# Patient Record
Sex: MALE | Race: WHITE | NOT HISPANIC OR LATINO | URBAN - METROPOLITAN AREA
[De-identification: names, ages, dates, MRNs, and addresses within clinical notes are randomized per-mention and may not be internally consistent; named-entity substitution may affect disease eponyms.]

---

## 2017-06-04 ENCOUNTER — INPATIENT (INPATIENT)
Facility: HOSPITAL | Age: 57
LOS: 0 days | Discharge: HOME | DRG: 340 | End: 2017-06-05
Attending: STUDENT IN AN ORGANIZED HEALTH CARE EDUCATION/TRAINING PROGRAM | Admitting: STUDENT IN AN ORGANIZED HEALTH CARE EDUCATION/TRAINING PROGRAM
Payer: COMMERCIAL

## 2017-06-04 VITALS
WEIGHT: 194.01 LBS | HEART RATE: 69 BPM | TEMPERATURE: 98 F | OXYGEN SATURATION: 99 % | SYSTOLIC BLOOD PRESSURE: 171 MMHG | HEIGHT: 72 IN | RESPIRATION RATE: 18 BRPM | DIASTOLIC BLOOD PRESSURE: 81 MMHG

## 2017-06-04 DIAGNOSIS — K35.3 ACUTE APPENDICITIS WITH LOCALIZED PERITONITIS: ICD-10-CM

## 2017-06-04 DIAGNOSIS — M21.969 UNSPECIFIED ACQUIRED DEFORMITY OF UNSPECIFIED LOWER LEG: Chronic | ICD-10-CM

## 2017-06-04 LAB
ACETONE SERPL-MCNC: NEGATIVE — SIGNIFICANT CHANGE UP
ALBUMIN SERPL ELPH-MCNC: 4.4 G/DL — SIGNIFICANT CHANGE UP (ref 3.3–5.2)
ALP SERPL-CCNC: 63 U/L — SIGNIFICANT CHANGE UP (ref 40–120)
ALT FLD-CCNC: 15 U/L — SIGNIFICANT CHANGE UP
ANION GAP SERPL CALC-SCNC: 13 MMOL/L — SIGNIFICANT CHANGE UP (ref 5–17)
APPEARANCE UR: CLEAR — SIGNIFICANT CHANGE UP
AST SERPL-CCNC: 15 U/L — SIGNIFICANT CHANGE UP
B-OH-BUTYR SERPL-SCNC: 0.3 MMOL/L — SIGNIFICANT CHANGE UP
BASOPHILS # BLD AUTO: 0 K/UL — SIGNIFICANT CHANGE UP (ref 0–0.2)
BASOPHILS NFR BLD AUTO: 0.1 % — SIGNIFICANT CHANGE UP (ref 0–2)
BILIRUB SERPL-MCNC: 0.5 MG/DL — SIGNIFICANT CHANGE UP (ref 0.4–2)
BILIRUB UR-MCNC: NEGATIVE — SIGNIFICANT CHANGE UP
BUN SERPL-MCNC: 23 MG/DL — HIGH (ref 8–20)
CALCIUM SERPL-MCNC: 9.4 MG/DL — SIGNIFICANT CHANGE UP (ref 8.6–10.2)
CHLORIDE SERPL-SCNC: 100 MMOL/L — SIGNIFICANT CHANGE UP (ref 98–107)
CO2 SERPL-SCNC: 24 MMOL/L — SIGNIFICANT CHANGE UP (ref 22–29)
COLOR SPEC: SIGNIFICANT CHANGE UP
CREAT SERPL-MCNC: 1.01 MG/DL — SIGNIFICANT CHANGE UP (ref 0.5–1.3)
DIFF PNL FLD: NEGATIVE — SIGNIFICANT CHANGE UP
EOSINOPHIL # BLD AUTO: 0 K/UL — SIGNIFICANT CHANGE UP (ref 0–0.5)
EOSINOPHIL NFR BLD AUTO: 0.4 % — SIGNIFICANT CHANGE UP (ref 0–6)
GLUCOSE SERPL-MCNC: 161 MG/DL — HIGH (ref 70–115)
GLUCOSE UR QL: NEGATIVE MG/DL — SIGNIFICANT CHANGE UP
HCT VFR BLD CALC: 39.6 % — LOW (ref 42–52)
HGB BLD-MCNC: 14 G/DL — SIGNIFICANT CHANGE UP (ref 14–18)
KETONES UR-MCNC: ABNORMAL
LEUKOCYTE ESTERASE UR-ACNC: NEGATIVE — SIGNIFICANT CHANGE UP
LYMPHOCYTES # BLD AUTO: 1.2 K/UL — SIGNIFICANT CHANGE UP (ref 1–4.8)
LYMPHOCYTES # BLD AUTO: 10.6 % — LOW (ref 20–55)
MCHC RBC-ENTMCNC: 28.5 PG — SIGNIFICANT CHANGE UP (ref 27–31)
MCHC RBC-ENTMCNC: 35.4 G/DL — SIGNIFICANT CHANGE UP (ref 32–36)
MCV RBC AUTO: 80.7 FL — SIGNIFICANT CHANGE UP (ref 80–94)
MONOCYTES # BLD AUTO: 0.6 K/UL — SIGNIFICANT CHANGE UP (ref 0–0.8)
MONOCYTES NFR BLD AUTO: 5.3 % — SIGNIFICANT CHANGE UP (ref 3–10)
NEUTROPHILS # BLD AUTO: 9.4 K/UL — HIGH (ref 1.8–8)
NEUTROPHILS NFR BLD AUTO: 83.5 % — HIGH (ref 37–73)
NITRITE UR-MCNC: NEGATIVE — SIGNIFICANT CHANGE UP
PH UR: 5 — SIGNIFICANT CHANGE UP (ref 5–8)
PLATELET # BLD AUTO: 193 K/UL — SIGNIFICANT CHANGE UP (ref 150–400)
POTASSIUM SERPL-MCNC: 4.4 MMOL/L — SIGNIFICANT CHANGE UP (ref 3.5–5.3)
POTASSIUM SERPL-SCNC: 4.4 MMOL/L — SIGNIFICANT CHANGE UP (ref 3.5–5.3)
PROT SERPL-MCNC: 7.6 G/DL — SIGNIFICANT CHANGE UP (ref 6.6–8.7)
PROT UR-MCNC: NEGATIVE MG/DL — SIGNIFICANT CHANGE UP
RBC # BLD: 4.91 M/UL — SIGNIFICANT CHANGE UP (ref 4.6–6.2)
RBC # FLD: 12.8 % — SIGNIFICANT CHANGE UP (ref 11–15.6)
SODIUM SERPL-SCNC: 137 MMOL/L — SIGNIFICANT CHANGE UP (ref 135–145)
SP GR SPEC: 1.01 — SIGNIFICANT CHANGE UP (ref 1.01–1.02)
UROBILINOGEN FLD QL: NEGATIVE MG/DL — SIGNIFICANT CHANGE UP
WBC # BLD: 11.3 K/UL — HIGH (ref 4.8–10.8)
WBC # FLD AUTO: 11.3 K/UL — HIGH (ref 4.8–10.8)

## 2017-06-04 PROCEDURE — 99285 EMERGENCY DEPT VISIT HI MDM: CPT | Mod: 25

## 2017-06-04 PROCEDURE — 93010 ELECTROCARDIOGRAM REPORT: CPT

## 2017-06-04 PROCEDURE — 74177 CT ABD & PELVIS W/CONTRAST: CPT | Mod: 26

## 2017-06-04 PROCEDURE — 88304 TISSUE EXAM BY PATHOLOGIST: CPT | Mod: 26

## 2017-06-04 RX ORDER — KETOROLAC TROMETHAMINE 30 MG/ML
30 SYRINGE (ML) INJECTION ONCE
Qty: 0 | Refills: 0 | Status: DISCONTINUED | OUTPATIENT
Start: 2017-06-04 | End: 2017-06-04

## 2017-06-04 RX ORDER — MORPHINE SULFATE 50 MG/1
2 CAPSULE, EXTENDED RELEASE ORAL ONCE
Qty: 0 | Refills: 0 | Status: DISCONTINUED | OUTPATIENT
Start: 2017-06-04 | End: 2017-06-04

## 2017-06-04 RX ORDER — KETOROLAC TROMETHAMINE 30 MG/ML
30 SYRINGE (ML) INJECTION ONCE
Qty: 0 | Refills: 0 | Status: COMPLETED | OUTPATIENT
Start: 2017-06-04 | End: 2017-06-04

## 2017-06-04 RX ORDER — CEFOTETAN DISODIUM 1 G
2 VIAL (EA) INJECTION ONCE
Qty: 0 | Refills: 0 | Status: DISCONTINUED | OUTPATIENT
Start: 2017-06-04 | End: 2017-06-05

## 2017-06-04 RX ORDER — PIPERACILLIN AND TAZOBACTAM 4; .5 G/20ML; G/20ML
3.38 INJECTION, POWDER, LYOPHILIZED, FOR SOLUTION INTRAVENOUS ONCE
Qty: 0 | Refills: 0 | Status: COMPLETED | OUTPATIENT
Start: 2017-06-04 | End: 2017-06-04

## 2017-06-04 RX ORDER — SODIUM CHLORIDE 9 MG/ML
1000 INJECTION, SOLUTION INTRAVENOUS
Qty: 0 | Refills: 0 | Status: DISCONTINUED | OUTPATIENT
Start: 2017-06-04 | End: 2017-06-05

## 2017-06-04 RX ORDER — ONDANSETRON 8 MG/1
4 TABLET, FILM COATED ORAL ONCE
Qty: 0 | Refills: 0 | Status: COMPLETED | OUTPATIENT
Start: 2017-06-04 | End: 2017-06-04

## 2017-06-04 RX ORDER — SODIUM CHLORIDE 9 MG/ML
1000 INJECTION, SOLUTION INTRAVENOUS ONCE
Qty: 0 | Refills: 0 | Status: COMPLETED | OUTPATIENT
Start: 2017-06-04 | End: 2017-06-04

## 2017-06-04 RX ADMIN — ONDANSETRON 4 MILLIGRAM(S): 8 TABLET, FILM COATED ORAL at 08:04

## 2017-06-04 RX ADMIN — SODIUM CHLORIDE 3000 MILLILITER(S): 9 INJECTION, SOLUTION INTRAVENOUS at 08:04

## 2017-06-04 RX ADMIN — Medication 30 MILLIGRAM(S): at 08:05

## 2017-06-04 RX ADMIN — ONDANSETRON 4 MILLIGRAM(S): 8 TABLET, FILM COATED ORAL at 12:53

## 2017-06-04 RX ADMIN — SODIUM CHLORIDE 150 MILLILITER(S): 9 INJECTION, SOLUTION INTRAVENOUS at 10:40

## 2017-06-04 RX ADMIN — MORPHINE SULFATE 2 MILLIGRAM(S): 50 CAPSULE, EXTENDED RELEASE ORAL at 13:00

## 2017-06-04 RX ADMIN — MORPHINE SULFATE 2 MILLIGRAM(S): 50 CAPSULE, EXTENDED RELEASE ORAL at 12:55

## 2017-06-04 RX ADMIN — PIPERACILLIN AND TAZOBACTAM 200 GRAM(S): 4; .5 INJECTION, POWDER, LYOPHILIZED, FOR SOLUTION INTRAVENOUS at 10:40

## 2017-06-04 RX ADMIN — Medication 30 MILLIGRAM(S): at 16:31

## 2017-06-04 NOTE — H&P ADULT - HISTORY OF PRESENT ILLNESS
Patient is a 56 year old male who presents to the ED with one day history of abdominal pain. Pt states the pain began last night, started at the suprapubic region, associated with anorexia, nausea, chills, but denies fever, emesis. Pt states on the way to the hospital, every bump in the road caused him increased pain.

## 2017-06-04 NOTE — ED PROVIDER NOTE - CARE PLAN
Principal Discharge DX:	Abdominal pain  Secondary Diagnosis:	Diabetes Principal Discharge DX:	Acute appendicitis with localized peritonitis  Secondary Diagnosis:	Diabetes

## 2017-06-04 NOTE — H&P ADULT - PROBLEM SELECTOR PLAN 1
-NPO  -IVF  -IV abx cefotetan  -preoperative evaluation including CXR, EKG, labs  -pain management  -dvt ppx  -OR for laparoscopic appendectomy possible open

## 2017-06-04 NOTE — ED PROVIDER NOTE - MEDICAL DECISION MAKING DETAILS
- clinical concern for appendicitis based on exam and history; ct a/p and labs, UA; less likely ischemic colitis, nephrolithiases, AAA (never smoker- per pt); checking acetone and betahydroxbutrate for possible DKA (less likely); pain control, IV fluids, and frequent reevaluations.

## 2017-06-04 NOTE — H&P ADULT - NSHPLABSRESULTS_GEN_ALL_CORE
I&O's Detail      LABS:                        14.0   11.3  )-----------( 193      ( 2017 07:54 )             39.6     06-04    137  |  100  |  23.0<H>  ----------------------------<  161<H>  4.4   |  24.0  |  1.01    Ca    9.4      2017 07:54    TPro  7.6  /  Alb  4.4  /  TBili  0.5  /  DBili  x   /  AST  15  /  ALT  15  /  AlkPhos  63  06-04      Urinalysis Basic - ( 2017 09:46 )    Color: Pale Yellow / Appearance: Clear / S.015 / pH: x  Gluc: x / Ketone: Small  / Bili: Negative / Urobili: Negative mg/dL   Blood: x / Protein: Negative mg/dL / Nitrite: Negative   Leuk Esterase: Negative / RBC: x / WBC x   Sq Epi: x / Non Sq Epi: x / Bacteria: x        RADIOLOGY & ADDITIONAL STUDIES:  CT: No bowel obstruction. Obstructing appendicolith at the base of the appendix with fluid-filled dilatation of the appendix, which measures up to 1.6 cm. Surrounding inflammatory changes are present

## 2017-06-04 NOTE — ED ADULT TRIAGE NOTE - CHIEF COMPLAINT QUOTE
patient states that he has RLQ abdominal pain radiating to back started last night after eating out, +N/ denies vomiting unable to sleep all night

## 2017-06-04 NOTE — ED ADULT NURSE NOTE - OBJECTIVE STATEMENT
pt reports intense pain to his right lower quad which started yesterday around 18:.00. pain is accompanied by nausea. pt has rebound tenderness and scaled 8/10. prt reports having diarrhea yesterday

## 2017-06-04 NOTE — H&P ADULT - NSHPPHYSICALEXAM_GEN_ALL_CORE
Vital Signs Last 24 Hrs  T(C): 37.1, Max: 37.1 (06-04 @ 12:05)  T(F): 98.8, Max: 98.8 (06-04 @ 12:05)  HR: 72 (69 - 75)  BP: 141/81 (135/81 - 171/81)  BP(mean): --  RR: 18 (18 - 18)  SpO2: 96% (96% - 99%)    PE  Gen: NAD, AAOx3  Pulm: CTAB  CV: RRR  Abd: soft, nondistended, RLQ, no rebound  Ext: moving all extremities  Vasc: 2+ peripheral pulses  Neuro: GCS 15, nonfocal

## 2017-06-04 NOTE — ED PROVIDER NOTE - OBJECTIVE STATEMENT
abd pain starting suprapubic last night, now focal RLQ, on drive to ED "was in more pain the more bumps we hit". ambulatory but "must lean forward because of pain" worsening with laying flat. no h/o similar. normal stools > 24 hours. no dysuria. no h/o "kidney stones" + diabetes

## 2017-06-04 NOTE — ED PROVIDER NOTE - PROGRESS NOTE DETAILS
pain improved, however acute appy on CT. ACS/Gen Sx aware will see momentarily. still pending COLE vitale @ 1206

## 2017-06-04 NOTE — ED ADULT NURSE REASSESSMENT NOTE - NS ED NURSE REASSESS COMMENT FT1
pt s/p ct awaiting surgical consult . pt states he has had relief form his pain with pain medication. ekg to be done and antibiotic therapy to be initiated
pt states jpain medication is wearing off and his pain is returning.  Md dela cruz made aware, pt medicated with morphine and Zofran per verbal order. will continue to monitor

## 2017-06-05 VITALS
SYSTOLIC BLOOD PRESSURE: 114 MMHG | RESPIRATION RATE: 18 BRPM | TEMPERATURE: 98 F | DIASTOLIC BLOOD PRESSURE: 72 MMHG | OXYGEN SATURATION: 96 % | HEART RATE: 87 BPM

## 2017-06-05 PROCEDURE — 82009 KETONE BODYS QUAL: CPT

## 2017-06-05 PROCEDURE — 96375 TX/PRO/DX INJ NEW DRUG ADDON: CPT

## 2017-06-05 PROCEDURE — 80053 COMPREHEN METABOLIC PANEL: CPT

## 2017-06-05 PROCEDURE — 93005 ELECTROCARDIOGRAM TRACING: CPT

## 2017-06-05 PROCEDURE — 74177 CT ABD & PELVIS W/CONTRAST: CPT

## 2017-06-05 PROCEDURE — 44970 LAPAROSCOPY APPENDECTOMY: CPT

## 2017-06-05 PROCEDURE — 85027 COMPLETE CBC AUTOMATED: CPT

## 2017-06-05 PROCEDURE — 81003 URINALYSIS AUTO W/O SCOPE: CPT

## 2017-06-05 PROCEDURE — 99285 EMERGENCY DEPT VISIT HI MDM: CPT | Mod: 25

## 2017-06-05 PROCEDURE — 96374 THER/PROPH/DIAG INJ IV PUSH: CPT | Mod: XU

## 2017-06-05 PROCEDURE — 88304 TISSUE EXAM BY PATHOLOGIST: CPT

## 2017-06-05 PROCEDURE — 82010 KETONE BODYS QUAN: CPT

## 2017-06-05 RX ORDER — SENNA PLUS 8.6 MG/1
2 TABLET ORAL AT BEDTIME
Qty: 0 | Refills: 0 | Status: DISCONTINUED | OUTPATIENT
Start: 2017-06-05 | End: 2017-06-05

## 2017-06-05 RX ORDER — GLUCAGON INJECTION, SOLUTION 0.5 MG/.1ML
1 INJECTION, SOLUTION SUBCUTANEOUS ONCE
Qty: 0 | Refills: 0 | Status: DISCONTINUED | OUTPATIENT
Start: 2017-06-05 | End: 2017-06-05

## 2017-06-05 RX ORDER — DEXTROSE 50 % IN WATER 50 %
25 SYRINGE (ML) INTRAVENOUS ONCE
Qty: 0 | Refills: 0 | Status: DISCONTINUED | OUTPATIENT
Start: 2017-06-05 | End: 2017-06-05

## 2017-06-05 RX ORDER — OXYCODONE HYDROCHLORIDE 5 MG/1
1 TABLET ORAL
Qty: 20 | Refills: 0 | OUTPATIENT
Start: 2017-06-05

## 2017-06-05 RX ORDER — INSULIN LISPRO 100/ML
VIAL (ML) SUBCUTANEOUS
Qty: 0 | Refills: 0 | Status: DISCONTINUED | OUTPATIENT
Start: 2017-06-05 | End: 2017-06-05

## 2017-06-05 RX ORDER — DOCUSATE SODIUM 100 MG
1 CAPSULE ORAL
Qty: 0 | Refills: 0 | COMMUNITY
Start: 2017-06-05

## 2017-06-05 RX ORDER — DEXTROSE 50 % IN WATER 50 %
12.5 SYRINGE (ML) INTRAVENOUS ONCE
Qty: 0 | Refills: 0 | Status: DISCONTINUED | OUTPATIENT
Start: 2017-06-05 | End: 2017-06-05

## 2017-06-05 RX ORDER — ENOXAPARIN SODIUM 100 MG/ML
30 INJECTION SUBCUTANEOUS EVERY 12 HOURS
Qty: 0 | Refills: 0 | Status: DISCONTINUED | OUTPATIENT
Start: 2017-06-05 | End: 2017-06-05

## 2017-06-05 RX ORDER — SODIUM CHLORIDE 9 MG/ML
1000 INJECTION, SOLUTION INTRAVENOUS
Qty: 0 | Refills: 0 | Status: DISCONTINUED | OUTPATIENT
Start: 2017-06-05 | End: 2017-06-05

## 2017-06-05 RX ORDER — SENNA PLUS 8.6 MG/1
2 TABLET ORAL
Qty: 0 | Refills: 0 | COMMUNITY
Start: 2017-06-05

## 2017-06-05 RX ORDER — DEXTROSE 50 % IN WATER 50 %
1 SYRINGE (ML) INTRAVENOUS ONCE
Qty: 0 | Refills: 0 | Status: DISCONTINUED | OUTPATIENT
Start: 2017-06-05 | End: 2017-06-05

## 2017-06-05 RX ORDER — KETOROLAC TROMETHAMINE 30 MG/ML
30 SYRINGE (ML) INJECTION ONCE
Qty: 0 | Refills: 0 | Status: DISCONTINUED | OUTPATIENT
Start: 2017-06-05 | End: 2017-06-05

## 2017-06-05 RX ORDER — HYDROMORPHONE HYDROCHLORIDE 2 MG/ML
0.5 INJECTION INTRAMUSCULAR; INTRAVENOUS; SUBCUTANEOUS
Qty: 0 | Refills: 0 | Status: DISCONTINUED | OUTPATIENT
Start: 2017-06-05 | End: 2017-06-05

## 2017-06-05 RX ORDER — CEFOTETAN DISODIUM 1 G
2 VIAL (EA) INJECTION EVERY 8 HOURS
Qty: 0 | Refills: 0 | Status: COMPLETED | OUTPATIENT
Start: 2017-06-05 | End: 2017-06-05

## 2017-06-05 RX ORDER — ONDANSETRON 8 MG/1
4 TABLET, FILM COATED ORAL ONCE
Qty: 0 | Refills: 0 | Status: DISCONTINUED | OUTPATIENT
Start: 2017-06-05 | End: 2017-06-05

## 2017-06-05 RX ORDER — DOCUSATE SODIUM 100 MG
100 CAPSULE ORAL THREE TIMES A DAY
Qty: 0 | Refills: 0 | Status: DISCONTINUED | OUTPATIENT
Start: 2017-06-05 | End: 2017-06-05

## 2017-06-05 RX ORDER — FENTANYL CITRATE 50 UG/ML
50 INJECTION INTRAVENOUS
Qty: 0 | Refills: 0 | Status: DISCONTINUED | OUTPATIENT
Start: 2017-06-05 | End: 2017-06-05

## 2017-06-05 RX ADMIN — ENOXAPARIN SODIUM 30 MILLIGRAM(S): 100 INJECTION SUBCUTANEOUS at 05:34

## 2017-06-05 RX ADMIN — Medication 4: at 09:02

## 2017-06-05 RX ADMIN — Medication 3: at 12:21

## 2017-06-05 RX ADMIN — Medication 100 GRAM(S): at 05:26

## 2017-06-05 RX ADMIN — Medication 100 MILLIGRAM(S): at 05:26

## 2017-06-05 NOTE — DISCHARGE NOTE ADULT - CARE PROVIDER_API CALL
surgery, acute care  [address] [facility] [address]  Phone: [phone]  Fax: (   )    -

## 2017-06-05 NOTE — DISCHARGE NOTE ADULT - HOSPITAL COURSE
subhash is a 56 year old male who presents to the ED with one day history of abdominal pain. Pt states the pain began last night, started at the suprapubic region, associated with anorexia, nausea, chills, but denies fever, emesis. Found to have CT confirmed acute appendicitis. Taken to OR for uncomplicated laparoscopic appendectomy. Patient received post op antibiotics per protocol. Patient tolerating regular diet, received DVT ppx per protocol and ambulating. Pain well controlled on oral medications. stable for discharge to home on 6/5 with outpatient follow up

## 2017-06-05 NOTE — DISCHARGE NOTE ADULT - PLAN OF CARE
pain control, diet tolerance, return to ADLs may shower only, no tub bathing, pat area dry. IF you have have steri strips (white band aids) leave in place until follow up appoint  follow up with acute care surgery clinic 7-14days after surgery for post op check   if any cioncerning signs or symptoms such as fever >101, inability to tolerate diet, increased/uncontrolled pain, persistent nausea/vomiting, increased redness/swelling/discharge from surgical site call office/ return to ED   take meds as directed  use stool softners and senna as needed   walk daily Follow up: Please call and make an appointment with the ACUTE CARE SURGERY CLINIC 10-14 days after discharge. Also, please call and make an appointment with your primary care physician as per your usual schedule.   Activity: Please limit activity and rest until your follow-up appointment - refrain from heavy lifting > 10-15 lbs  Diet: May continue regular diet  Medications: Please take all home medications as prescribed by your primary care doctor. Pain medication has been prescribed for you. Please, take it as it has been prescribed, do not drive or operate heavy machinery while taking narcotics. Please take stool softener daily to prevent constipation.  Wound Care: Please, keep wound site clean and dry. You may shower, but do not bathe  If confusion, altered mental status, fever, chest pain, shortness of breath, new or worsening abdominal pain, vomiting, change or worsening of medical status, please come back to the emergency room, and in case of emergency call 911.

## 2017-06-05 NOTE — DISCHARGE NOTE ADULT - MEDICATION SUMMARY - MEDICATIONS TO TAKE
I will START or STAY ON the medications listed below when I get home from the hospital:    acetaminophen-oxycodone 325 mg-5 mg oral tablet  -- 1 tab(s) by mouth every 6 hours, As needed,for pain MDD:4  -- Indication: For pain    Janumet  mg-1000 mg oral tablet, extended release  -- 1 tab(s) by mouth once a day (in the evening)  -- Indication: For home med    docusate sodium 100 mg oral capsule  -- 1 cap(s) by mouth 3 times a day  -- Indication: For constipation    senna oral tablet  -- 2 tab(s) by mouth once a day (at bedtime)  -- Indication: For constipation

## 2017-06-05 NOTE — DISCHARGE NOTE ADULT - CARE PLAN
Principal Discharge DX:	Acute appendicitis with localized peritonitis  Goal:	pain control, diet tolerance, return to ADLs  Instructions for follow-up, activity and diet:	may shower only, no tub bathing, pat area dry. IF you have have steri strips (white band aids) leave in place until follow up appoint  follow up with acute care surgery clinic 7-14days after surgery for post op check   if any cioncerning signs or symptoms such as fever >101, inability to tolerate diet, increased/uncontrolled pain, persistent nausea/vomiting, increased redness/swelling/discharge from surgical site call office/ return to ED   take meds as directed  use stool softners and senna as needed   walk daily Principal Discharge DX:	Acute appendicitis with localized peritonitis  Goal:	pain control, diet tolerance, return to ADLs  Instructions for follow-up, activity and diet:	Follow up: Please call and make an appointment with the ACUTE CARE SURGERY CLINIC 10-14 days after discharge. Also, please call and make an appointment with your primary care physician as per your usual schedule.   Activity: Please limit activity and rest until your follow-up appointment - refrain from heavy lifting > 10-15 lbs  Diet: May continue regular diet  Medications: Please take all home medications as prescribed by your primary care doctor. Pain medication has been prescribed for you. Please, take it as it has been prescribed, do not drive or operate heavy machinery while taking narcotics. Please take stool softener daily to prevent constipation.  Wound Care: Please, keep wound site clean and dry. You may shower, but do not bathe  If confusion, altered mental status, fever, chest pain, shortness of breath, new or worsening abdominal pain, vomiting, change or worsening of medical status, please come back to the emergency room, and in case of emergency call 911.

## 2017-06-05 NOTE — DISCHARGE NOTE ADULT - PATIENT PORTAL LINK FT
“You can access the [employer] Patient Portal, offered by [facility], by registering with the following website: [email or web address]”

## 2017-06-05 NOTE — DISCHARGE NOTE ADULT - NS AS ACTIVITY OBS
Do not drive or operate machinery/No Heavy lifting/straining/Do not make important decisions/Showering allowed

## 2017-06-05 NOTE — DISCHARGE NOTE ADULT - PROVIDER TOKENS
FREE:[LAST:[surgery],FIRST:[acute care],PHONE:[phone]],FAX:[(   )    -],ADDRESS:[[address] [place] [address]]]

## 2017-06-06 NOTE — CHART NOTE - NSCHARTNOTEFT_GEN_A_CORE
[facility] call from service to call back Mr. Hassan ([phone]) regarding questions he had about new symptoms he is experiencing. Mr. Candelario was discharged yesterday after an uncomplicated laparoscopic appendectomy. Called Mr. Hassan - he states he started experiencing "swelling" to the left side of his abdomen which he feels radiates down his groin approaching his testicles. He states pain is worse with movement, tender to palpation, denies any redness or streaking to the area. He states that the rest of his abdomen feels "fine," notices no discharge or erythema around any of the incision sites. He denies nausea / vomiting. States he has been able to tolerate regular diet without any issues. Denies fever or chills. Explained to patient that he may have developed an infection around one of his port sites, however it is hard to speculate without a physical exam. Patient was advised to call the [facility] clinic first thing in the morning and request an appointment for this thursday 6/8. Patient given the clinic phone number which he repeated back to me. Explained to patient in detail that if any of his symptoms change or worsen, if he develops fever/chills, worsening pain, nausea or vomiting, if the swelling in the area increases or if it becomes red or there is any new drainage from incision sites that he is to return immediately to the ED. Patient expressed verbal understanding of the plan, repeated the plan back to me, and called his wife into the room while we were on the phone to write down the instructions. Advised patient to call back if he has any difficulty making appointment at the clinic. [facility] call from service to call back Mr. Hassan ([phone]) regarding questions he had about new symptoms he is experiencing. Mr. Candelario was discharged yesterday after an uncomplicated laparoscopic appendectomy. Called Mr. Hassan - he states he started experiencing "swelling" to the left side of his abdomen which he feels radiates down his groin approaching his testicles. He states pain is worse with movement, tender to palpation, denies any redness or streaking to the area. He states that the rest of his abdomen feels "fine," notices no discharge or erythema around any of the incision sites. He denies nausea / vomiting. States he has been able to tolerate regular diet without any issues. Admits to voiding and having normal bowel function. Denies fever or chills. Explained to patient that he may have developed an infection around one of his port sites, however it is hard to speculate without a physical exam. Patient was advised to call the [facility] clinic first thing in the morning and request an appointment for this thursday 6/8. Patient given the clinic phone number which he repeated back to me. Explained to patient in detail that if any of his symptoms change or worsen, if he develops fever/chills, worsening pain, nausea or vomiting, if the swelling in the area increases or if it becomes red or there is any new drainage from incision sites that he is to return immediately to the ED. Patient expressed verbal understanding of the plan, repeated the plan back to me, and called his wife into the room while we were on the phone to write down the instructions. Advised patient to call back if he has any difficulty making appointment at the clinic. [facility] call from service to call back Mr. Hassan ([phone]) regarding questions he had about new symptoms he is experiencing. Mr. Candelario was discharged yesterday after an uncomplicated laparoscopic appendectomy. Called Mr. Hassan - he states he started experiencing "swelling" to the left side of his abdomen which he feels radiates down his groin approaching his testicles. He states pain is worse with movement, tender to palpation, denies any redness or streaking to the area. He states that the rest of his abdomen feels "fine," notices no discharge or erythema around any of the incision sites. He denies nausea / vomiting. States he has been able to tolerate regular diet without any issues. Admits to voiding without pain and having normal bowel function. Denies fever or chills. Explained to patient that he may have developed an infection around one of his port sites, however it is hard to speculate without a physical exam. Patient was advised to call the [facility] clinic first thing in the morning and request an appointment for this thursday 6/8. Patient given the clinic phone number which he repeated back to me. Explained to patient in detail that if any of his symptoms change or worsen, if he develops fever/chills, worsening pain, nausea or vomiting, if the swelling in the area increases or if it becomes red or there is any new drainage from incision sites that he is to return immediately to the ED. Patient expressed verbal understanding of the plan, repeated the plan back to me, and called his wife into the room while we were on the phone to write down the instructions. Advised patient to call back if he has any difficulty making appointment at the clinic.

## 2017-06-07 ENCOUNTER — TRANSCRIPTION ENCOUNTER (OUTPATIENT)
Age: 57
End: 2017-06-07

## 2017-06-09 PROBLEM — Z00.00 ENCOUNTER FOR PREVENTIVE HEALTH EXAMINATION: Status: ACTIVE | Noted: 2017-06-09

## 2017-06-12 PROBLEM — E11.9 TYPE 2 DIABETES MELLITUS WITHOUT COMPLICATIONS: Chronic | Status: ACTIVE | Noted: 2017-06-04

## 2017-06-13 ENCOUNTER — APPOINTMENT (OUTPATIENT)
Dept: TRAUMA SURGERY | Facility: CLINIC | Age: 57
End: 2017-06-13

## 2017-06-13 ENCOUNTER — OTHER (OUTPATIENT)
Age: 57
End: 2017-06-13

## 2017-06-13 VITALS
RESPIRATION RATE: 16 BRPM | TEMPERATURE: 97.9 F | SYSTOLIC BLOOD PRESSURE: 123 MMHG | HEIGHT: 72 IN | OXYGEN SATURATION: 100 % | HEART RATE: 88 BPM | WEIGHT: 194 LBS | DIASTOLIC BLOOD PRESSURE: 83 MMHG | BODY MASS INDEX: 26.28 KG/M2

## 2017-06-13 DIAGNOSIS — Z82.49 FAMILY HISTORY OF ISCHEMIC HEART DISEASE AND OTHER DISEASES OF THE CIRCULATORY SYSTEM: ICD-10-CM

## 2017-06-13 DIAGNOSIS — Z80.0 FAMILY HISTORY OF MALIGNANT NEOPLASM OF DIGESTIVE ORGANS: ICD-10-CM

## 2017-06-13 DIAGNOSIS — Z78.9 OTHER SPECIFIED HEALTH STATUS: ICD-10-CM

## 2017-06-13 DIAGNOSIS — Z86.39 PERSONAL HISTORY OF OTHER ENDOCRINE, NUTRITIONAL AND METABOLIC DISEASE: ICD-10-CM

## 2017-06-13 DIAGNOSIS — Z82.3 FAMILY HISTORY OF STROKE: ICD-10-CM

## 2017-06-14 PROBLEM — Z82.49 FAMILY HISTORY OF CARDIAC DISORDER: Status: ACTIVE | Noted: 2017-06-13

## 2017-06-14 PROBLEM — Z78.9 NON-SMOKER: Status: ACTIVE | Noted: 2017-06-13

## 2017-06-14 PROBLEM — Z82.49 FAMILY HISTORY OF MYOCARDIAL INFARCTION: Status: ACTIVE | Noted: 2017-06-13

## 2017-06-14 PROBLEM — Z80.0 FAMILY HISTORY OF MALIGNANT NEOPLASM OF STOMACH: Status: ACTIVE | Noted: 2017-06-13

## 2017-06-14 PROBLEM — Z82.3 FAMILY HISTORY OF CEREBROVASCULAR ACCIDENT (CVA): Status: ACTIVE | Noted: 2017-06-13

## 2017-06-20 ENCOUNTER — APPOINTMENT (OUTPATIENT)
Dept: TRAUMA SURGERY | Facility: CLINIC | Age: 57
End: 2017-06-20

## 2017-06-27 ENCOUNTER — APPOINTMENT (OUTPATIENT)
Dept: TRAUMA SURGERY | Facility: CLINIC | Age: 57
End: 2017-06-27

## 2017-06-27 VITALS
HEART RATE: 67 BPM | RESPIRATION RATE: 16 BRPM | BODY MASS INDEX: 26.68 KG/M2 | OXYGEN SATURATION: 100 % | SYSTOLIC BLOOD PRESSURE: 119 MMHG | HEIGHT: 72 IN | WEIGHT: 197 LBS | TEMPERATURE: 97.8 F | DIASTOLIC BLOOD PRESSURE: 75 MMHG

## 2017-06-27 DIAGNOSIS — Z90.49 ACQUIRED ABSENCE OF OTHER SPECIFIED PARTS OF DIGESTIVE TRACT: ICD-10-CM
